# Patient Record
Sex: FEMALE | Race: WHITE | NOT HISPANIC OR LATINO | Employment: FULL TIME | URBAN - METROPOLITAN AREA
[De-identification: names, ages, dates, MRNs, and addresses within clinical notes are randomized per-mention and may not be internally consistent; named-entity substitution may affect disease eponyms.]

---

## 2022-05-27 ENCOUNTER — APPOINTMENT (OUTPATIENT)
Dept: RADIOLOGY | Facility: MEDICAL CENTER | Age: 22
End: 2022-05-27
Attending: EMERGENCY MEDICINE
Payer: COMMERCIAL

## 2022-05-27 ENCOUNTER — HOSPITAL ENCOUNTER (EMERGENCY)
Facility: MEDICAL CENTER | Age: 22
End: 2022-05-27
Attending: EMERGENCY MEDICINE
Payer: COMMERCIAL

## 2022-05-27 VITALS
DIASTOLIC BLOOD PRESSURE: 87 MMHG | RESPIRATION RATE: 14 BRPM | HEART RATE: 82 BPM | SYSTOLIC BLOOD PRESSURE: 131 MMHG | BODY MASS INDEX: 25.88 KG/M2 | HEIGHT: 67 IN | WEIGHT: 164.9 LBS | OXYGEN SATURATION: 100 % | TEMPERATURE: 98 F

## 2022-05-27 DIAGNOSIS — U07.1 COVID-19: ICD-10-CM

## 2022-05-27 DIAGNOSIS — R55 SYNCOPE, UNSPECIFIED SYNCOPE TYPE: ICD-10-CM

## 2022-05-27 LAB
ALBUMIN SERPL BCP-MCNC: 4.4 G/DL (ref 3.2–4.9)
ALBUMIN/GLOB SERPL: 1.6 G/DL
ALP SERPL-CCNC: 79 U/L (ref 30–99)
ALT SERPL-CCNC: 10 U/L (ref 2–50)
ANION GAP SERPL CALC-SCNC: 11 MMOL/L (ref 7–16)
APPEARANCE UR: CLEAR
AST SERPL-CCNC: 15 U/L (ref 12–45)
BASOPHILS # BLD AUTO: 0.2 % (ref 0–1.8)
BASOPHILS # BLD: 0.01 K/UL (ref 0–0.12)
BILIRUB SERPL-MCNC: 0.4 MG/DL (ref 0.1–1.5)
BILIRUB UR QL STRIP.AUTO: NEGATIVE
BUN SERPL-MCNC: 11 MG/DL (ref 8–22)
CALCIUM SERPL-MCNC: 9.1 MG/DL (ref 8.4–10.2)
CHLORIDE SERPL-SCNC: 103 MMOL/L (ref 96–112)
CO2 SERPL-SCNC: 23 MMOL/L (ref 20–33)
COLOR UR: YELLOW
CREAT SERPL-MCNC: 0.58 MG/DL (ref 0.5–1.4)
EKG IMPRESSION: NORMAL
EOSINOPHIL # BLD AUTO: 0.06 K/UL (ref 0–0.51)
EOSINOPHIL NFR BLD: 1.4 % (ref 0–6.9)
ERYTHROCYTE [DISTWIDTH] IN BLOOD BY AUTOMATED COUNT: 38.5 FL (ref 35.9–50)
FLUAV RNA SPEC QL NAA+PROBE: NEGATIVE
FLUBV RNA SPEC QL NAA+PROBE: NEGATIVE
GFR SERPLBLD CREATININE-BSD FMLA CKD-EPI: 132 ML/MIN/1.73 M 2
GLOBULIN SER CALC-MCNC: 2.8 G/DL (ref 1.9–3.5)
GLUCOSE SERPL-MCNC: 92 MG/DL (ref 65–99)
GLUCOSE UR STRIP.AUTO-MCNC: NEGATIVE MG/DL
HCG SERPL QL: NEGATIVE
HCT VFR BLD AUTO: 42.4 % (ref 37–47)
HGB BLD-MCNC: 13.9 G/DL (ref 12–16)
IMM GRANULOCYTES # BLD AUTO: 0.01 K/UL (ref 0–0.11)
IMM GRANULOCYTES NFR BLD AUTO: 0.2 % (ref 0–0.9)
KETONES UR STRIP.AUTO-MCNC: NEGATIVE MG/DL
LEUKOCYTE ESTERASE UR QL STRIP.AUTO: NEGATIVE
LYMPHOCYTES # BLD AUTO: 1.86 K/UL (ref 1–4.8)
LYMPHOCYTES NFR BLD: 43.8 % (ref 22–41)
MCH RBC QN AUTO: 29.1 PG (ref 27–33)
MCHC RBC AUTO-ENTMCNC: 32.8 G/DL (ref 33.6–35)
MCV RBC AUTO: 88.9 FL (ref 81.4–97.8)
MICRO URNS: NORMAL
MONOCYTES # BLD AUTO: 0.38 K/UL (ref 0–0.85)
MONOCYTES NFR BLD AUTO: 8.9 % (ref 0–13.4)
NEUTROPHILS # BLD AUTO: 1.93 K/UL (ref 2–7.15)
NEUTROPHILS NFR BLD: 45.5 % (ref 44–72)
NITRITE UR QL STRIP.AUTO: NEGATIVE
NRBC # BLD AUTO: 0 K/UL
NRBC BLD-RTO: 0 /100 WBC
PH UR STRIP.AUTO: 7 [PH] (ref 5–8)
PLATELET # BLD AUTO: 200 K/UL (ref 164–446)
PMV BLD AUTO: 10.8 FL (ref 9–12.9)
POTASSIUM SERPL-SCNC: 3.8 MMOL/L (ref 3.6–5.5)
PROT SERPL-MCNC: 7.2 G/DL (ref 6–8.2)
PROT UR QL STRIP: NEGATIVE MG/DL
RBC # BLD AUTO: 4.77 M/UL (ref 4.2–5.4)
RBC UR QL AUTO: NEGATIVE
RSV RNA SPEC QL NAA+PROBE: NEGATIVE
SARS-COV-2 RNA RESP QL NAA+PROBE: DETECTED
SODIUM SERPL-SCNC: 137 MMOL/L (ref 135–145)
SP GR UR STRIP.AUTO: <=1.005
SPECIMEN SOURCE: ABNORMAL
TROPONIN T SERPL-MCNC: <6 NG/L (ref 6–19)
WBC # BLD AUTO: 4.3 K/UL (ref 4.8–10.8)

## 2022-05-27 PROCEDURE — 99284 EMERGENCY DEPT VISIT MOD MDM: CPT

## 2022-05-27 PROCEDURE — 93005 ELECTROCARDIOGRAM TRACING: CPT | Performed by: EMERGENCY MEDICINE

## 2022-05-27 PROCEDURE — 93005 ELECTROCARDIOGRAM TRACING: CPT

## 2022-05-27 PROCEDURE — C9803 HOPD COVID-19 SPEC COLLECT: HCPCS | Performed by: EMERGENCY MEDICINE

## 2022-05-27 PROCEDURE — 71045 X-RAY EXAM CHEST 1 VIEW: CPT

## 2022-05-27 PROCEDURE — 84703 CHORIONIC GONADOTROPIN ASSAY: CPT

## 2022-05-27 PROCEDURE — 36415 COLL VENOUS BLD VENIPUNCTURE: CPT

## 2022-05-27 PROCEDURE — 85025 COMPLETE CBC W/AUTO DIFF WBC: CPT

## 2022-05-27 PROCEDURE — 81003 URINALYSIS AUTO W/O SCOPE: CPT

## 2022-05-27 PROCEDURE — 700105 HCHG RX REV CODE 258: Performed by: EMERGENCY MEDICINE

## 2022-05-27 PROCEDURE — 80053 COMPREHEN METABOLIC PANEL: CPT

## 2022-05-27 PROCEDURE — 0241U HCHG SARS-COV-2 COVID-19 NFCT DS RESP RNA 4 TRGT MIC: CPT

## 2022-05-27 PROCEDURE — 84484 ASSAY OF TROPONIN QUANT: CPT

## 2022-05-27 RX ORDER — SODIUM CHLORIDE, SODIUM LACTATE, POTASSIUM CHLORIDE, CALCIUM CHLORIDE 600; 310; 30; 20 MG/100ML; MG/100ML; MG/100ML; MG/100ML
1000 INJECTION, SOLUTION INTRAVENOUS ONCE
Status: COMPLETED | OUTPATIENT
Start: 2022-05-27 | End: 2022-05-27

## 2022-05-27 RX ADMIN — SODIUM CHLORIDE, POTASSIUM CHLORIDE, SODIUM LACTATE AND CALCIUM CHLORIDE 1000 ML: 600; 310; 30; 20 INJECTION, SOLUTION INTRAVENOUS at 17:46

## 2022-05-27 ASSESSMENT — PAIN DESCRIPTION - PAIN TYPE: TYPE: ACUTE PAIN

## 2022-05-28 NOTE — ED PROVIDER NOTES
"ED Provider Note    CHIEF COMPLAINT  Chief Complaint   Patient presents with   • Syncope     X 2 since Tues night Felt it coming on both days and laid down on her bed No trauma  Denied CP or SOB at time   • Cough     Dry Onset Wed Now moist No fever  Pt Vaccinated for Covid but missing booster   • Nasal Congestion     And mild sore throat \" from cough \"       [facility]  Balta Escobedo is a 22 y.o. female who presents to the emergency department for evaluation of a syncopal episode.  The patient states that she had syncope on Tuesday.  She states she was cooking dinner and felt hot and flushed and lightheaded.  She was cooking dinner and she kept going to sit down when she would sit down she did feel better.  After cooking dinner she was reaching above her fridge and felt dizzy and her vision went gray and dark.  She saw some spots but was able to make it to her bed and passed out on her bed.  She did not sustain an injury.  The patient had a second episode after that.  She woke up in the morning got up and walked to the bathroom sink was brushing her teeth felt dizzy and then sat down.  She broke up on the ground with her father over her.  Denies any trauma.  She still intermittently dizzy.  No chest pain or shortness of breath.     The patient does have a sore throat, congestion over the cough.  Sore throat is mostly from coughing.  No chest pain or shortness of breath.  Cough is dry nonproductive.  She did vaccine with the Pfizer vaccine.     Denies any other aggravating alleviating factors or associated complaints.  Denies history of PE or DVT.  Denies any history of arrhythmia.  Denies any other aggravating relieving factors or associated complaints.  Not on birth control.    REVIEW OF SYSTEMS  See HPI for further details. All other systems are negative.    PAST MEDICAL HISTORY  Past Medical History:   Diagnosis Date   • Insomnia    • PCOS (polycystic ovarian syndrome)        FAMILY HISTORY  History reviewed. No " "pertinent family history.    SOCIAL HISTORY  Social History     Tobacco Use   • Smoking status: Never Smoker   • Smokeless tobacco: Never Used   Vaping Use   • Vaping Use: Former   • Substances: Nicotine   Substance and Sexual Activity   • Alcohol use: Yes     Comment: occass   • Drug use: Never       SURGICAL HISTORY  History reviewed. No pertinent surgical history.    CURRENT MEDICATIONS  Home Medications    **Home medications have not yet been reviewed for this encounter**         ALLERGIES  No Known Allergies    PHYSICAL EXAM  VITAL SIGNS: /86   Pulse 77   Temp 36.7 °C (98 °F) (Temporal)   Resp 19   Ht 1.702 m (5' 7\")   Wt 74.8 kg (164 lb 14.5 oz)   LMP 04/12/2022   SpO2 98%   BMI 25.83 kg/m²    Constitutional: Well developed, Well nourished, No acute distress, Non-toxic appearance.   HENT: Normocephalic, Atraumatic, Bilateral external ears normal  Eyes: PERRL, EOMI, Conjunctiva normal, No discharge.   Neck: Normal range of motion  Cardiovascular: Normal heart rate, Normal rhythm, No murmurs, No rubs, No gallops.   Thorax & Lungs: Normal breath sounds, No respiratory distress, No wheezing  Abdomen: Bowel sounds normal, Soft, No tendernes.   Skin: Warm, Dry, No erythema, No rash.   Back: No tenderness, No CVA tenderness.   Musculoskeletal: Good range of motion in all major joints.  No asymmetric edema good pulses  Neurologic: Alert, No focal deficits noted.   Psychiatric: Affect normal    Results for orders placed or performed during the hospital encounter of 05/27/22   CBC with Differential   Result Value Ref Range    WBC 4.3 (L) 4.8 - 10.8 K/uL    RBC 4.77 4.20 - 5.40 M/uL    Hemoglobin 13.9 12.0 - 16.0 g/dL    Hematocrit 42.4 37.0 - 47.0 %    MCV 88.9 81.4 - 97.8 fL    MCH 29.1 27.0 - 33.0 pg    MCHC 32.8 (L) 33.6 - 35.0 g/dL    RDW 38.5 35.9 - 50.0 fL    Platelet Count 200 164 - 446 K/uL    MPV 10.8 9.0 - 12.9 fL    Neutrophils-Polys 45.50 44.00 - 72.00 %    Lymphocytes 43.80 (H) 22.00 - 41.00 % "    Monocytes 8.90 0.00 - 13.40 %    Eosinophils 1.40 0.00 - 6.90 %    Basophils 0.20 0.00 - 1.80 %    Immature Granulocytes 0.20 0.00 - 0.90 %    Nucleated RBC 0.00 /100 WBC    Neutrophils (Absolute) 1.93 (L) 2.00 - 7.15 K/uL    Lymphs (Absolute) 1.86 1.00 - 4.80 K/uL    Monos (Absolute) 0.38 0.00 - 0.85 K/uL    Eos (Absolute) 0.06 0.00 - 0.51 K/uL    Baso (Absolute) 0.01 0.00 - 0.12 K/uL    Immature Granulocytes (abs) 0.01 0.00 - 0.11 K/uL    NRBC (Absolute) 0.00 K/uL   Complete Metabolic Panel (CMP)   Result Value Ref Range    Sodium 137 135 - 145 mmol/L    Potassium 3.8 3.6 - 5.5 mmol/L    Chloride 103 96 - 112 mmol/L    Co2 23 20 - 33 mmol/L    Anion Gap 11.0 7.0 - 16.0    Glucose 92 65 - 99 mg/dL    Bun 11 8 - 22 mg/dL    Creatinine 0.58 0.50 - 1.40 mg/dL    Calcium 9.1 8.4 - 10.2 mg/dL    AST(SGOT) 15 12 - 45 U/L    ALT(SGPT) 10 2 - 50 U/L    Alkaline Phosphatase 79 30 - 99 U/L    Total Bilirubin 0.4 0.1 - 1.5 mg/dL    Albumin 4.4 3.2 - 4.9 g/dL    Total Protein 7.2 6.0 - 8.2 g/dL    Globulin 2.8 1.9 - 3.5 g/dL    A-G Ratio 1.6 g/dL   Troponin   Result Value Ref Range    Troponin T <6 6 - 19 ng/L   BETA-HCG QUALITATIVE SERUM   Result Value Ref Range    Beta-Hcg Qualitative Serum Negative Negative   URINALYSIS    Specimen: Urine, Clean Catch   Result Value Ref Range    Color Yellow     Character Clear     Specific Gravity <=1.005 <1.035    Ph 7.0 5.0 - 8.0    Glucose Negative Negative mg/dL    Ketones Negative Negative mg/dL    Protein Negative Negative mg/dL    Bilirubin Negative Negative    Nitrite Negative Negative    Leukocyte Esterase Negative Negative    Occult Blood Negative Negative    Micro Urine Req see below    CoV-2, FLU A/B, and RSV by PCR (2-4 Hours CEPHEID) : Collect NP swab in VTM    Specimen: Nasopharyngeal; Respirate   Result Value Ref Range    Influenza virus A RNA Negative Negative    Influenza virus B, PCR Negative Negative    RSV, PCR Negative Negative    SARS-CoV-2 by PCR DETECTED (AA)      SARS-CoV-2 Source NP Swab    ESTIMATED GFR   Result Value Ref Range    GFR (CKD-EPI) 132 >60 mL/min/1.73 m 2   EKG   Result Value Ref Range    Report       [facility]    Test Date:  2022  Pt Name:    LOC RUCKER             Department: [facility]  MRN:        [id number]                      Room:  Gender:     Female                       Technician:   :        [id number]                   Requested By:ER TRIAGE PROTOCOL  Order #:    [id number]                    Reading MD: MINOR RAMIREZ. CONRADO    Measurements  Intervals                                Axis  Rate:       73                           P:          69  DE:         128                          QRS:        66  QRSD:       92                           T:          15  QT:         385  QTc:        425    Interpretive Statements  Sinus arrhythmia  No previous ECG available for comparison  Electronically Signed On 2022 17:27:25 PDT by MINOR RAMIREZ. CONRADO          RADIOLOGY/PROCEDURES  DX-CHEST-PORTABLE (1 VIEW)   Final Result      No evidence of acute cardiopulmonary process.            COURSE & MEDICAL DECISION MAKING  Pertinent Labs & Imaging studies reviewed. (See chart for details)    The patient presents the emergency department for evaluation of 2 syncopal episodes earlier this week and now cough and cold symptoms.  Patient describes 2 syncope episodes.  The first 1 sounds like vasovagal syncope where she was hot and flushed and ultimately was able to walk to her bed and passed out.  The second 1 sounds like it might be orthostatic after standing up and walk to the bathroom to brush her teeth.  Patient denies any injury.  A broad differential diagnosis was considered for syncope including topic pregnancy, dehydration, electrolyte abnormality, cardiac arrhythmia or situational syncope to name a few.      Syncope work-up was unremarkable including a normal EKG a normal period of monitoring and normal  cardiac exam and relatively benign sounding history.  Labs are reassuring EKG is reassuring.    We will give the patient some IV fluids.  She does have COVID-19.  I think she is low risk for COVID lung injury so think IV fluids are reasonable in light of her orthostasis and situational syncope.  I the patient can be discharged home.  We will give her some fluids.  She will be discharged home with COVID-19 return precautions.  She will return for worsening symptoms or other concerns including cough shortness of breath more dizziness passing out or other concerns.  At this point her examination is normal and the patient is discharged in good condition.    Follow-up with primary care doctor.    FINAL IMPRESSION  1. COVID-19     2. Syncope, unspecified syncope type         2.   3.         Electronically signed by: Jerson Taylor M.D., 5/27/2022 5:27 PM

## 2022-05-28 NOTE — ED NOTES
Discharge instructions reviewed with patient. AVS signed by patient. PIV removed. No new prescriptions. Patient understands to follow-up with primary care provider and to return to ED for worsening symptoms. All questions answered at this time. Patient ambulated to exit with belongings. Patient in stable condition with no signs of distress. Patient agreeable to discharge instructions.

## 2022-05-28 NOTE — DISCHARGE INSTRUCTIONS
Drink plenty of fluids.  Take ibuprofen or Tylenol over-the-counter for aches and pains.  Return for worsening cough, shortness of breath fever or other concerns.  Follow-up with your doctor.

## 2023-02-04 ENCOUNTER — OFFICE VISIT (OUTPATIENT)
Dept: URGENT CARE | Facility: CLINIC | Age: 23
End: 2023-02-04
Payer: COMMERCIAL

## 2023-02-04 VITALS
HEIGHT: 67 IN | TEMPERATURE: 98.4 F | OXYGEN SATURATION: 98 % | RESPIRATION RATE: 16 BRPM | BODY MASS INDEX: 24.8 KG/M2 | WEIGHT: 158 LBS | SYSTOLIC BLOOD PRESSURE: 120 MMHG | DIASTOLIC BLOOD PRESSURE: 80 MMHG | HEART RATE: 82 BPM

## 2023-02-04 DIAGNOSIS — L03.317 CELLULITIS OF BUTTOCK: ICD-10-CM

## 2023-02-04 PROCEDURE — 99213 OFFICE O/P EST LOW 20 MIN: CPT | Performed by: FAMILY MEDICINE

## 2023-02-04 RX ORDER — SULFAMETHOXAZOLE AND TRIMETHOPRIM 800; 160 MG/1; MG/1
1 TABLET ORAL 2 TIMES DAILY
Qty: 10 TABLET | Refills: 0 | Status: SHIPPED | OUTPATIENT
Start: 2023-02-04 | End: 2023-02-09

## 2023-02-04 RX ORDER — TRAZODONE HYDROCHLORIDE 50 MG/1
50 TABLET ORAL NIGHTLY
COMMUNITY

## 2023-02-04 RX ORDER — ESCITALOPRAM OXALATE 10 MG/1
10 TABLET ORAL DAILY
COMMUNITY

## 2023-02-04 ASSESSMENT — FIBROSIS 4 INDEX: FIB4 SCORE: 0.5

## 2023-02-04 ASSESSMENT — ENCOUNTER SYMPTOMS: FEVER: 0

## 2023-02-04 NOTE — PROGRESS NOTES
"Subjective:     Balta Escobedo is a 22 y.o. female who presents for Abscess (Buttocks x Sunday)    HPI  Pt presents for evaluation of an acute problem  Patient with infection of the buttocks area for the past week  Has been stable and not growing or shrinking  The past 2 days has started to have some drainage which is yellow and thick  Pain is not improving much  Area is on the left buttock, not midline, and not near the anus  Does not feel ill otherwise    Review of Systems   Constitutional:  Negative for fever.   Skin:  Positive for rash.     PMH:  has a past medical history of Insomnia and PCOS (polycystic ovarian syndrome).  MEDS:   Current Outpatient Medications:     escitalopram (LEXAPRO) 10 MG Tab, Take 10 mg by mouth every day., Disp: , Rfl:     traZODone (DESYREL) 50 MG Tab, Take 50 mg by mouth every evening., Disp: , Rfl:   ALLERGIES: No Known Allergies  SURGHX: History reviewed. No pertinent surgical history.  SOCHX:  reports that she has never smoked. She has never used smokeless tobacco. She reports current alcohol use. She reports that she does not use drugs.     Objective:   /80   Pulse 82   Temp 36.9 °C (98.4 °F) (Temporal)   Resp 16   Ht 1.702 m (5' 7\")   Wt 71.7 kg (158 lb)   LMP 12/13/2022 (Approximate)   SpO2 98%   Breastfeeding No   BMI 24.75 kg/m²     Physical Exam  Constitutional:       General: She is not in acute distress.     Appearance: She is well-developed. She is not diaphoretic.   Pulmonary:      Effort: Pulmonary effort is normal.   Skin:     Comments: Left buttock with small area of erythema approximately 2 cm x 2 cm with central open lesion with small purulent drainage.  With gentle pressure, minimal drainage expressed.   Neurological:      Mental Status: She is alert.       Assessment/Plan:   Assessment    1. Cellulitis of buttock  - sulfamethoxazole-trimethoprim (BACTRIM DS) 800-160 MG tablet; Take 1 Tablet by mouth 2 times a day for 5 days.  Dispense: 10 Tablet; " Refill: 0    Other orders  - escitalopram (LEXAPRO) 10 MG Tab; Take 10 mg by mouth every day.  - traZODone (DESYREL) 50 MG Tab; Take 50 mg by mouth every evening.    Patient with cellulitis of the skin of the buttock.  Has developed a tiny abscess which is draining well on its own.  No significant fluctuance below and did not recommend attempted further incision and drainage.  Will start Bactrim and recommended avoiding squeezing the area.  Follow-up if not fully resolving with current plan.

## 2024-04-21 ENCOUNTER — OFFICE VISIT (OUTPATIENT)
Dept: URGENT CARE | Facility: CLINIC | Age: 24
End: 2024-04-21
Payer: COMMERCIAL

## 2024-04-21 VITALS
WEIGHT: 151 LBS | DIASTOLIC BLOOD PRESSURE: 80 MMHG | SYSTOLIC BLOOD PRESSURE: 122 MMHG | HEIGHT: 67 IN | HEART RATE: 98 BPM | TEMPERATURE: 97 F | RESPIRATION RATE: 20 BRPM | OXYGEN SATURATION: 100 % | BODY MASS INDEX: 23.7 KG/M2

## 2024-04-21 DIAGNOSIS — J06.9 VIRAL URI WITH COUGH: ICD-10-CM

## 2024-04-21 PROCEDURE — 3074F SYST BP LT 130 MM HG: CPT | Performed by: NURSE PRACTITIONER

## 2024-04-21 PROCEDURE — 99213 OFFICE O/P EST LOW 20 MIN: CPT | Performed by: NURSE PRACTITIONER

## 2024-04-21 PROCEDURE — 3079F DIAST BP 80-89 MM HG: CPT | Performed by: NURSE PRACTITIONER

## 2024-04-21 RX ORDER — ALBUTEROL SULFATE 90 UG/1
2 AEROSOL, METERED RESPIRATORY (INHALATION) EVERY 6 HOURS PRN
Qty: 8.5 G | Refills: 0 | Status: SHIPPED | OUTPATIENT
Start: 2024-04-21

## 2024-04-21 RX ORDER — BENZONATATE 100 MG/1
100 CAPSULE ORAL 3 TIMES DAILY PRN
Qty: 60 CAPSULE | Refills: 0 | Status: SHIPPED | OUTPATIENT
Start: 2024-04-21

## 2024-04-21 ASSESSMENT — ENCOUNTER SYMPTOMS
COUGH: 1
SHORTNESS OF BREATH: 1

## 2024-04-21 ASSESSMENT — FIBROSIS 4 INDEX: FIB4 SCORE: 0.55

## 2024-04-21 NOTE — LETTER
April 21, 2024    To Whom It May Concern:         This is confirmation that Balta Villacoalexandra attended her scheduled appointment with ISATU Aguyao on 4/21/24.        Please excuse her from work 4/22/24-4/23/24.    Sincerely,      PATTIE Aguayo.  [phone]

## 2024-04-21 NOTE — PROGRESS NOTES
"Zaid Escobedo is a 24 y.o. female who presents with Shortness of Breath (2 days Shortness of breath, wheezing at times, tightness in chest and sore throat)            Shortness of Breath  This is a new problem. Episode onset: pt reports new onset of cough and SOB that started yesterday. reports cough is worse today. no fevers. no hx of asthma or smoking. +SOB with exertion. She has tried OTC cough suppressants for the symptoms. The treatment provided no relief. There is no history of asthma.       Review of Systems   Respiratory:  Positive for cough and shortness of breath.    All other systems reviewed and are negative.         Past Medical History:   Diagnosis Date    Insomnia     PCOS (polycystic ovarian syndrome)     History reviewed. No pertinent surgical history.   Social History     Socioeconomic History    Marital status: Single     Spouse name: Not on file    Number of children: Not on file    Years of education: Not on file    Highest education level: Not on file   Occupational History    Not on file   Tobacco Use    Smoking status: Never    Smokeless tobacco: Never   Vaping Use    Vaping Use: Former    Substances: Nicotine   Substance and Sexual Activity    Alcohol use: Yes     Comment: occass    Drug use: Never    Sexual activity: Not on file   Other Topics Concern    Not on file   Social History Narrative    Not on file     Social Determinants of Health     Financial Resource Strain: Not on file   Food Insecurity: Not on file   Transportation Needs: Not on file   Physical Activity: Not on file   Stress: Not on file   Social Connections: Not on file   Intimate Partner Violence: Not on file   Housing Stability: Not on file         Objective     /80   Pulse 98   Temp 36.1 °C (97 °F) (Temporal)   Resp 20   Ht 1.702 m (5' 7\")   Wt 68.5 kg (151 lb)   SpO2 100%   BMI 23.65 kg/m²      Physical Exam  Vitals and nursing note reviewed.   Constitutional:       Appearance: Normal " appearance. She is normal weight.   HENT:      Head: Normocephalic and atraumatic.      Right Ear: Tympanic membrane and external ear normal.      Left Ear: Tympanic membrane and external ear normal.      Nose: Congestion present.      Mouth/Throat:      Mouth: Mucous membranes are moist.      Pharynx: Oropharynx is clear.   Eyes:      Extraocular Movements: Extraocular movements intact.      Pupils: Pupils are equal, round, and reactive to light.   Cardiovascular:      Rate and Rhythm: Normal rate and regular rhythm.   Pulmonary:      Effort: Pulmonary effort is normal.      Breath sounds: Normal breath sounds. No wheezing.   Musculoskeletal:         General: Normal range of motion.      Cervical back: Normal range of motion.   Skin:     General: Skin is warm and dry.      Capillary Refill: Capillary refill takes less than 2 seconds.   Neurological:      General: No focal deficit present.      Mental Status: She is alert and oriented to person, place, and time. Mental status is at baseline.   Psychiatric:         Mood and Affect: Mood normal.         Speech: Speech normal.         Thought Content: Thought content normal.         Judgment: Judgment normal.                             Assessment & Plan        1. Viral URI with cough  - albuterol 108 (90 Base) MCG/ACT Aero Soln inhalation aerosol; Inhale 2 Puffs every 6 hours as needed for Shortness of Breath.  Dispense: 8.5 g; Refill: 0  - benzonatate (TESSALON) 100 MG Cap; Take 1 Capsule by mouth 3 times a day as needed for Cough.  Dispense: 60 Capsule; Refill: 0       Viral etiology discussed  Encouraged rest, fluids and supportive care  Work note provided  Supportive care, differential diagnoses, and indications for immediate follow-up discussed with patient.    Pathogenesis of diagnosis discussed including typical length and natural progression.    Instructed to return to  or nearest emergency department if symptoms fail to improve, for any change in condition,  further concerns, or new concerning symptoms.  Patient states understanding of the plan of care and discharge instructions.

## 2024-05-23 ENCOUNTER — APPOINTMENT (RX ONLY)
Dept: URBAN - METROPOLITAN AREA CLINIC 6 | Facility: CLINIC | Age: 24
Setting detail: DERMATOLOGY
End: 2024-05-23

## 2024-05-23 DIAGNOSIS — Z71.89 OTHER SPECIFIED COUNSELING: ICD-10-CM

## 2024-05-23 DIAGNOSIS — L59.0 ERYTHEMA AB IGNE [DERMATITIS AB IGNE]: ICD-10-CM

## 2024-05-23 PROCEDURE — ? REFERRAL CORRESPONDENCE

## 2024-05-23 PROCEDURE — ? COUNSELING

## 2024-05-23 PROCEDURE — ? DIAGNOSIS COMMENT

## 2024-05-23 PROCEDURE — 99202 OFFICE O/P NEW SF 15 MIN: CPT

## 2024-05-23 ASSESSMENT — LOCATION SIMPLE DESCRIPTION DERM
LOCATION SIMPLE: LEFT UPPER BACK
LOCATION SIMPLE: RIGHT THIGH

## 2024-05-23 ASSESSMENT — LOCATION ZONE DERM
LOCATION ZONE: LEG
LOCATION ZONE: TRUNK

## 2024-05-23 ASSESSMENT — LOCATION DETAILED DESCRIPTION DERM
LOCATION DETAILED: LEFT SUPERIOR MEDIAL UPPER BACK
LOCATION DETAILED: RIGHT ANTERIOR DISTAL THIGH

## 2024-05-23 NOTE — PROCEDURE: DIAGNOSIS COMMENT
Comment: Possible dx. Based on history. Consider wearing UV clothing, avoid direct heat. PCP ordered lab work. Requested records for review. Our direct line provided for pt to call when rash present for bx.
Detail Level: Simple
Render Risk Assessment In Note?: no

## 2024-05-30 ENCOUNTER — APPOINTMENT (RX ONLY)
Dept: URBAN - METROPOLITAN AREA CLINIC 6 | Facility: CLINIC | Age: 24
Setting detail: DERMATOLOGY
End: 2024-05-30

## 2024-05-30 DIAGNOSIS — Z71.89 OTHER SPECIFIED COUNSELING: ICD-10-CM

## 2024-05-30 DIAGNOSIS — L30.9 DERMATITIS, UNSPECIFIED: ICD-10-CM

## 2024-05-30 PROCEDURE — 99212 OFFICE O/P EST SF 10 MIN: CPT

## 2024-05-30 PROCEDURE — ? DIAGNOSIS COMMENT

## 2024-05-30 PROCEDURE — ? REFERRAL CORRESPONDENCE

## 2024-05-30 PROCEDURE — ? COUNSELING

## 2024-05-30 ASSESSMENT — LOCATION ZONE DERM
LOCATION ZONE: TRUNK
LOCATION ZONE: LEG

## 2024-05-30 ASSESSMENT — LOCATION DETAILED DESCRIPTION DERM
LOCATION DETAILED: LEFT SUPERIOR MEDIAL UPPER BACK
LOCATION DETAILED: RIGHT ANTERIOR DISTAL THIGH

## 2024-05-30 ASSESSMENT — LOCATION SIMPLE DESCRIPTION DERM
LOCATION SIMPLE: RIGHT THIGH
LOCATION SIMPLE: LEFT UPPER BACK

## 2024-05-30 NOTE — PROCEDURE: DIAGNOSIS COMMENT
Comment: Reviewed lab results, possibly could by porphyria. Pt. will be going to hematologist for further testing. RTC if rash returns for bx
Detail Level: Simple
Render Risk Assessment In Note?: no

## 2024-08-13 ENCOUNTER — OFFICE VISIT (OUTPATIENT)
Dept: URGENT CARE | Facility: CLINIC | Age: 24
End: 2024-08-13
Payer: COMMERCIAL

## 2024-08-13 VITALS
WEIGHT: 193.4 LBS | HEART RATE: 90 BPM | BODY MASS INDEX: 30.35 KG/M2 | HEIGHT: 67 IN | SYSTOLIC BLOOD PRESSURE: 118 MMHG | OXYGEN SATURATION: 100 % | RESPIRATION RATE: 20 BRPM | TEMPERATURE: 98 F | DIASTOLIC BLOOD PRESSURE: 70 MMHG
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
## 2024-08-13 PROCEDURE — 99213 OFFICE O/P EST LOW 20 MIN: CPT | Mod: 25 | Performed by: NURSE PRACTITIONER

## 2024-08-13 PROCEDURE — 90471 IMMUNIZATION ADMIN: CPT | Performed by: NURSE PRACTITIONER

## 2024-08-13 PROCEDURE — 90715 TDAP VACCINE 7 YRS/> IM: CPT | Performed by: NURSE PRACTITIONER

## 2024-08-13 RX ORDER — PREDNISONE 20 MG/1
TABLET ORAL
COMMUNITY
Start: 2024-05-17

## 2024-08-14 NOTE — PROGRESS NOTES
"Subjective:   Balta Escobedo is a 24 y.o. female who presents for Dog Bite (Dog bite on right leg, happen on sunday )      HPI  24 old female presents with concern for dog bite to her right distal inner thigh.  States the dog bite happened on Sunday, the dog is now in quarantine.  Denies fever, chills nausea vomiting.  She has a large amount of bruising and was concerned over some erythema in the area.  She has not been evaluated for this before today.  Unknown last Tdap.  Review of Systems   Skin:         Right lower medial thigh with dog bite per HPI       Medications, Allergies, and current problem list reviewed today in Epic.     Objective:     /70 (BP Location: Left arm, Patient Position: Sitting, BP Cuff Size: Adult)   Pulse 90   Temp 36.7 °C (98 °F) (Temporal)   Resp 20   Ht 1.702 m (5' 7\")   Wt 87.7 kg (193 lb 6.4 oz)   SpO2 100%   BMI 30.29 kg/m²     Physical Exam  Skin:     Findings: Signs of injury present.             Comments: Dog bite right medial distal thigh.  Ecchymosis surrounding the bite itself, there is a small area of erythema which further discussion I believe is probably secondary to the dressing adhesive.  Measurements of this wound are 9 cm x 8 cm.           No results found.  Assessment   Assessment/Plan:     Diagnosis and associated orders:     1. Dog bite, initial encounter  - Tdap Vaccine =>8YO IM  - amoxicillin-clavulanate (AUGMENTIN) 875-125 MG Tab; Take 1 Tablet by mouth 2 times a day for 10 days.  Dispense: 20 Tablet; Refill: 0    Other orders  - predniSONE (DELTASONE) 20 MG Tab     Comments/MDM:     Pleasant 24 old female presents with concern for dog bite to right medial thigh.  Vital signs are stable, afebrile.  She is in no acute distress.  Does not appear ill.  Focused exam demonstrates the distal medial thigh has an area approximately 9 cm x 8 cm of ecchymosis and slight erythema.  The bite itself is well-approximated without drainage.  See photo above. " Discussed wound care, recommend Polysporin, and a dressing.  Needs to change his daily.  I am also covering her with Augmentin for the bite itself.  Discussing concern for rabies and I have provided her with information for the [facility] department as well as the emergency department if she is going to require these vaccinations.  Tdap updated.  Instructed to follow-up in 3 days to reassess the dog bite, sooner of course if she has a worsening condition with treatment.  Medication discussion include its use and potential benefits and side effects.  Education was provided regarding the aforementioned exam findings.  All of the patient's/parents questions were answered to their satisfaction prior to discharging the patient.  Encouraged to monitor symptoms closely and together we reviewed the signs and symptoms which cause concern for return to clinic or emergency department evaluation. Patient verbalized stated agreement with understanding of plan.          Please note that this dictation was created using voice recognition software. I have made every reasonable attempt to correct obvious errors, but I expect that there may be errors of grammar and possibly content that I did not discover before finalizing the note.   This note was electronically signed by ISATU Terry

## 2024-08-14 NOTE — PATIENT INSTRUCTIONS
For public, report the bite to the [facility] Vector-borne Disease Program at [phone] and [facility] at [phone]      Some Facts about Rabies and Bats  · Bats, skunks, foxes, raccoons, bobcats and coyotes all can   transmit rabies. However, most cases of rabies are found in bats.   In 2015, 11% of bats tested in [place] were positive for rabies.  · Bats are typically nocturnal. They are the only mammal that can fly under their own power.  · Bats emerge at dusk to feed on insects, primarily moths and beetles.   · If a bat is active during daylight hours it may be infected with rabies. If you see a bat flying during the   day, contact the [facility] Vector-borne Disease Program at [phone].  · Avoid all contact with bats. Do not handle sick, injured, or dead animals. Handling a bat may precipitate   rabies prophylaxis (several anti-rabies injections/shots) which is not a pleasant or inexpensive   experience. If a person or their pet should come into contact with a bat it should be kept for rabies   testing. The bat should be bagged and stored in a disposable cooler on ice until it can be submitted for   testing. Do not freeze the bat.  · The rabies virus is transmitted in the infected animal's saliva through biting a person or another animal.  · Rabies is fatal. If the virus is allowed to develop, the bite of a bat is lethal if the bat has the rabies   virus.  · Unvaccinated pets that come in contact with infected animals can develop rabies.   · Protect your pets against rabies by staying current on rabies vaccinations. Rabies vaccine should be  given to your pets every one to three years.  · Pets must be vaccinated before exposure to rabies virus to be protected.  · If you are bitten or scratched by a wild or domestic animal, wash the wound with soap and water and   call your physician immediately.  · For public, report the bite to the [facility] Vector-borne Disease Program at [phone] and [facility] at [phone]. For healthcare providers seeing a   patient with animal bite, report the bite to the [facility] Communicable Disease   Program at [phone].  · [facility] offers low cost vaccination clinics on the first Tuesday of every   month at [address] [place] [address] in [place] 3pm-7pm. Visit [email or web address] Keep your pet healthy!

## 2024-10-18 ENCOUNTER — OFFICE VISIT (OUTPATIENT)
Dept: URGENT CARE | Facility: CLINIC | Age: 24
End: 2024-10-18
Payer: COMMERCIAL

## 2024-10-18 VITALS
RESPIRATION RATE: 14 BRPM | BODY MASS INDEX: 29.66 KG/M2 | DIASTOLIC BLOOD PRESSURE: 78 MMHG | TEMPERATURE: 97.2 F | SYSTOLIC BLOOD PRESSURE: 122 MMHG | WEIGHT: 189 LBS | HEART RATE: 93 BPM | OXYGEN SATURATION: 99 % | HEIGHT: 67 IN

## 2024-10-18 DIAGNOSIS — T30.0 FRICTION BURN: ICD-10-CM

## 2024-10-18 PROCEDURE — 3074F SYST BP LT 130 MM HG: CPT | Performed by: PHYSICIAN ASSISTANT

## 2024-10-18 PROCEDURE — 3078F DIAST BP <80 MM HG: CPT | Performed by: PHYSICIAN ASSISTANT

## 2024-10-18 PROCEDURE — 99213 OFFICE O/P EST LOW 20 MIN: CPT | Performed by: PHYSICIAN ASSISTANT

## 2024-10-18 RX ORDER — SILVER SULFADIAZINE 10 MG/G
CREAM TOPICAL ONCE
Status: DISCONTINUED | OUTPATIENT
Start: 2024-10-18 | End: 2024-10-18

## 2024-10-18 RX ORDER — SILVER SULFADIAZINE 10 MG/G
CREAM TOPICAL ONCE
Status: COMPLETED | OUTPATIENT
Start: 2024-10-18 | End: 2024-10-18

## 2024-10-18 RX ORDER — DOXYCYCLINE HYCLATE 100 MG
100 TABLET ORAL 2 TIMES DAILY
Qty: 14 TABLET | Refills: 0 | Status: SHIPPED | OUTPATIENT
Start: 2024-10-18 | End: 2024-10-25

## 2024-10-18 RX ORDER — SILVER SULFADIAZINE 10 MG/G
CREAM TOPICAL
Qty: 40 G | Refills: 0 | Status: SHIPPED | OUTPATIENT
Start: 2024-10-18

## 2024-10-18 RX ADMIN — SILVER SULFADIAZINE 1 G: 10 CREAM TOPICAL at 10:34

## 2024-10-18 ASSESSMENT — ENCOUNTER SYMPTOMS
CHILLS: 0
TINGLING: 0
FOCAL WEAKNESS: 0
NAUSEA: 0
SENSORY CHANGE: 0
FEVER: 0
VOMITING: 0

## 2025-05-22 ENCOUNTER — APPOINTMENT (OUTPATIENT)
Dept: URBAN - METROPOLITAN AREA CLINIC 6 | Facility: CLINIC | Age: 25
Setting detail: DERMATOLOGY
End: 2025-05-22

## 2025-05-22 DIAGNOSIS — Z71.89 OTHER SPECIFIED COUNSELING: ICD-10-CM

## 2025-05-22 DIAGNOSIS — L30.9 DERMATITIS, UNSPECIFIED: ICD-10-CM

## 2025-05-22 PROCEDURE — 99212 OFFICE O/P EST SF 10 MIN: CPT

## 2025-05-22 PROCEDURE — ? COUNSELING

## 2025-05-22 PROCEDURE — ? DIAGNOSIS COMMENT

## 2025-05-22 ASSESSMENT — LOCATION DETAILED DESCRIPTION DERM: LOCATION DETAILED: RIGHT PROXIMAL LATERAL POSTERIOR UPPER ARM

## 2025-05-22 ASSESSMENT — LOCATION ZONE DERM: LOCATION ZONE: ARM

## 2025-05-22 ASSESSMENT — LOCATION SIMPLE DESCRIPTION DERM: LOCATION SIMPLE: RIGHT POSTERIOR UPPER ARM

## 2025-06-16 ENCOUNTER — HOSPITAL ENCOUNTER (OUTPATIENT)
Facility: MEDICAL CENTER | Age: 25
End: 2025-06-16
Attending: PREVENTIVE MEDICINE
Payer: COMMERCIAL

## 2025-06-16 ENCOUNTER — EH NON-PROVIDER (OUTPATIENT)
Dept: OCCUPATIONAL MEDICINE | Facility: CLINIC | Age: 25
End: 2025-06-16

## 2025-06-16 DIAGNOSIS — Z02.89 PHYSICAL EXAMINATION OF EMPLOYEE: Primary | ICD-10-CM

## 2025-06-16 DIAGNOSIS — Z02.89 PHYSICAL EXAMINATION OF EMPLOYEE: ICD-10-CM

## 2025-06-16 LAB
AMP AMPHETAMINE: NORMAL
BAR BARBITURATES: NORMAL
BZO BENZODIAZEPINES: NORMAL
COC COCAINE: NORMAL
INT CON NEG: NORMAL
INT CON POS: NORMAL
MDMA ECSTASY: NORMAL
MET METHAMPHETAMINES: NORMAL
MTD METHADONE: NORMAL
OPI OPIATES: NORMAL
OXY OXYCODONE: NORMAL
PCP PHENCYCLIDINE: NORMAL
POC URINE DRUG SCREEN OCDRS: NORMAL
THC: NORMAL

## 2025-06-16 PROCEDURE — 86735 MUMPS ANTIBODY: CPT | Performed by: PREVENTIVE MEDICINE

## 2025-06-16 PROCEDURE — 80305 DRUG TEST PRSMV DIR OPT OBS: CPT | Performed by: PREVENTIVE MEDICINE

## 2025-06-16 PROCEDURE — 86762 RUBELLA ANTIBODY: CPT | Performed by: PREVENTIVE MEDICINE

## 2025-06-16 PROCEDURE — 86787 VARICELLA-ZOSTER ANTIBODY: CPT | Performed by: PREVENTIVE MEDICINE

## 2025-06-16 PROCEDURE — 86765 RUBEOLA ANTIBODY: CPT | Performed by: PREVENTIVE MEDICINE

## 2025-06-16 PROCEDURE — 90746 HEPB VACCINE 3 DOSE ADULT IM: CPT | Mod: JZ | Performed by: PREVENTIVE MEDICINE

## 2025-06-16 PROCEDURE — 90471 IMMUNIZATION ADMIN: CPT | Performed by: PREVENTIVE MEDICINE

## 2025-06-16 PROCEDURE — 86480 TB TEST CELL IMMUN MEASURE: CPT | Performed by: PREVENTIVE MEDICINE

## 2025-06-16 NOTE — PROGRESS NOTES
This patient was seen for a pre-employment MA visit.     Company- [employer]   Position - Par     Pre-employment drug screen completed - yes   Color blindness test - NA   Quantiferon Gold TB test -drawn   Provider Physical - NA   Mask Fit- N95/CAPR/PAPR -NA   Spirometry date -NA      Hand Hygiene form - yes     Vaccines/Titers     Tdap - docs   COVID -19-docs   MMR - drawn    Varicella - drawn   Hepatitis B - admin #1  Hepatitis A - NA    Flu Shot - NA    Sticker provided - NA   Specialty or other testing needed at this time -NA     The patient has been informed about and verbalizes understanding of the applicable pre-employment requirements set by the employer. Yes   The patient has been informed about and verbalizes understanding that they may be required to return to Occupational Health for additional vaccinations or testing. Yes

## 2025-06-18 LAB
GAMMA INTERFERON BACKGROUND BLD IA-ACNC: 0.02 IU/ML
M TB IFN-G BLD-IMP: NEGATIVE
M TB IFN-G CD4+ BCKGRND COR BLD-ACNC: 0 IU/ML
MEV IGG SER-ACNC: 162 AU/ML
MITOGEN IGNF BCKGRD COR BLD-ACNC: 5.18 IU/ML
MUV IGG SER IA-ACNC: 6.2 AU/ML
QFT TB2 - NIL TBQ2: 0.03 IU/ML
RUBV AB SER QL: 149 IU/ML
VZV IGG SER IA-ACNC: 0.66 S/CO

## 2025-06-24 ENCOUNTER — EH NON-PROVIDER (OUTPATIENT)
Dept: OCCUPATIONAL MEDICINE | Facility: CLINIC | Age: 25
End: 2025-06-24

## 2025-06-24 DIAGNOSIS — Z23 NEED FOR VACCINATION: Primary | ICD-10-CM

## 2025-07-29 ENCOUNTER — EH NON-PROVIDER (OUTPATIENT)
Dept: OCCUPATIONAL MEDICINE | Facility: CLINIC | Age: 25
End: 2025-07-29

## 2025-07-29 DIAGNOSIS — Z23 NEED FOR VACCINATION: Primary | ICD-10-CM
